# Patient Record
Sex: MALE | Race: WHITE | Employment: STUDENT | URBAN - METROPOLITAN AREA
[De-identification: names, ages, dates, MRNs, and addresses within clinical notes are randomized per-mention and may not be internally consistent; named-entity substitution may affect disease eponyms.]

---

## 2020-05-05 ENCOUNTER — APPOINTMENT (OUTPATIENT)
Dept: CT IMAGING | Facility: HOSPITAL | Age: 25
End: 2020-05-05
Attending: EMERGENCY MEDICINE
Payer: COMMERCIAL

## 2020-05-05 ENCOUNTER — HOSPITAL ENCOUNTER (EMERGENCY)
Facility: HOSPITAL | Age: 25
Discharge: HOME | End: 2020-05-05
Attending: EMERGENCY MEDICINE
Payer: COMMERCIAL

## 2020-05-05 VITALS
SYSTOLIC BLOOD PRESSURE: 121 MMHG | BODY MASS INDEX: 24.31 KG/M2 | DIASTOLIC BLOOD PRESSURE: 83 MMHG | TEMPERATURE: 98 F | WEIGHT: 210.13 LBS | HEIGHT: 78 IN | OXYGEN SATURATION: 97 % | RESPIRATION RATE: 17 BRPM | HEART RATE: 60 BPM

## 2020-05-05 DIAGNOSIS — N20.0 KIDNEY STONE: Primary | ICD-10-CM

## 2020-05-05 PROCEDURE — 81001 URINALYSIS AUTO W/SCOPE: CPT | Performed by: EMERGENCY MEDICINE

## 2020-05-05 PROCEDURE — 96374 THER/PROPH/DIAG INJ IV PUSH: CPT

## 2020-05-05 PROCEDURE — 85025 COMPLETE CBC W/AUTO DIFF WBC: CPT | Performed by: EMERGENCY MEDICINE

## 2020-05-05 PROCEDURE — 96376 TX/PRO/DX INJ SAME DRUG ADON: CPT

## 2020-05-05 PROCEDURE — 96375 TX/PRO/DX INJ NEW DRUG ADDON: CPT

## 2020-05-05 PROCEDURE — 99284 EMERGENCY DEPT VISIT MOD MDM: CPT

## 2020-05-05 PROCEDURE — 74176 CT ABD & PELVIS W/O CONTRAST: CPT | Performed by: EMERGENCY MEDICINE

## 2020-05-05 PROCEDURE — 80053 COMPREHEN METABOLIC PANEL: CPT | Performed by: EMERGENCY MEDICINE

## 2020-05-05 RX ORDER — ONDANSETRON 4 MG/1
4 TABLET, ORALLY DISINTEGRATING ORAL EVERY 4 HOURS PRN
Qty: 10 TABLET | Refills: 0 | Status: SHIPPED | OUTPATIENT
Start: 2020-05-05 | End: 2020-05-11

## 2020-05-05 RX ORDER — KETOROLAC TROMETHAMINE 30 MG/ML
15 INJECTION, SOLUTION INTRAMUSCULAR; INTRAVENOUS ONCE
Status: COMPLETED | OUTPATIENT
Start: 2020-05-05 | End: 2020-05-05

## 2020-05-05 RX ORDER — TAMSULOSIN HYDROCHLORIDE 0.4 MG/1
0.4 CAPSULE ORAL DAILY
Qty: 7 CAPSULE | Refills: 0 | Status: SHIPPED | OUTPATIENT
Start: 2020-05-05 | End: 2020-05-12

## 2020-05-05 RX ORDER — HYDROMORPHONE HYDROCHLORIDE 1 MG/ML
0.5 INJECTION, SOLUTION INTRAMUSCULAR; INTRAVENOUS; SUBCUTANEOUS EVERY 30 MIN PRN
Status: DISCONTINUED | OUTPATIENT
Start: 2020-05-05 | End: 2020-05-05

## 2020-05-05 RX ORDER — HYDROCODONE BITARTRATE AND ACETAMINOPHEN 5; 325 MG/1; MG/1
1-2 TABLET ORAL EVERY 4 HOURS PRN
Qty: 20 TABLET | Refills: 0 | Status: SHIPPED | OUTPATIENT
Start: 2020-05-05 | End: 2020-05-11

## 2020-05-05 RX ORDER — ONDANSETRON 2 MG/ML
4 INJECTION INTRAMUSCULAR; INTRAVENOUS ONCE
Status: COMPLETED | OUTPATIENT
Start: 2020-05-05 | End: 2020-05-05

## 2020-05-05 NOTE — ED INITIAL ASSESSMENT (HPI)
Arrives with c/o left sided flank pain that began approximately 30 minutes PTA. Hx of kidney stones.

## 2020-05-05 NOTE — ED PROVIDER NOTES
Patient Seen in: [facility] Emergency Department      History   Patient presents with:  Abdomen/Flank Pain    Stated Complaint: back pain, urinating blood this am     HPI    53-year-old male comes to the hospital with a complaint of having difficult masses noted  Back nontender without CVA tenderness  Extremity no clubbing, cyanosis or edema noted.   Full range of motion noted without tenderness  Neuro: No focal deficits noted    ED Course     Labs Reviewed   URINALYSIS WITH CULTURE REFLEX - Abnormal; Registry) which includes the Dose Index Registry. PATIENT STATED HISTORY: (As transcribed by Technologist)  Patient presents with left flank pain with hematuria sudden onset since 830am.  Burning sensation with urination. Hx Kidney stones.     FINDINGS:  K tromethamine (TORADOL) 30 MG/ML injection 15 mg (15 mg Intravenous Given 5/5/20 0920)   ondansetron HCl (ZOFRAN) injection 4 mg (4 mg Intravenous Given 5/5/20 0920)     The patient is pain-free at this time.   He will be discharged home and follow-up with gloria

## 2020-05-08 ENCOUNTER — OFFICE VISIT (OUTPATIENT)
Dept: SURGERY | Facility: CLINIC | Age: 25
End: 2020-05-08
Payer: COMMERCIAL

## 2020-05-08 ENCOUNTER — LAB ENCOUNTER (OUTPATIENT)
Dept: LAB | Age: 25
End: 2020-05-08
Attending: UROLOGY
Payer: COMMERCIAL

## 2020-05-08 ENCOUNTER — TELEPHONE (OUTPATIENT)
Dept: SURGERY | Facility: CLINIC | Age: 25
End: 2020-05-08

## 2020-05-08 VITALS — SYSTOLIC BLOOD PRESSURE: 118 MMHG | HEART RATE: 80 BPM | DIASTOLIC BLOOD PRESSURE: 79 MMHG

## 2020-05-08 DIAGNOSIS — N20.1 BILATERAL URETERAL CALCULI: ICD-10-CM

## 2020-05-08 DIAGNOSIS — N20.0 RENAL CALCULI: ICD-10-CM

## 2020-05-08 DIAGNOSIS — N20.1 URETERAL STONE: Primary | ICD-10-CM

## 2020-05-08 PROCEDURE — 99243 OFF/OP CNSLTJ NEW/EST LOW 30: CPT | Performed by: UROLOGY

## 2020-05-08 PROCEDURE — 80048 BASIC METABOLIC PNL TOTAL CA: CPT

## 2020-05-08 PROCEDURE — 81003 URINALYSIS AUTO W/O SCOPE: CPT | Performed by: UROLOGY

## 2020-05-08 PROCEDURE — 36415 COLL VENOUS BLD VENIPUNCTURE: CPT

## 2020-05-08 NOTE — TELEPHONE ENCOUNTER
Spoke to patient in response to his questions about some activity restrictions. Informed him that there is no specific activity restrictions for patient with kidney stones.  But RN encouraged him to increase fluid intake, avoid high in sodium, and cautious

## 2020-05-08 NOTE — PROGRESS NOTES
Rooming Clinician:     Judy Marks is a 27year old male. Patient presents with:   Follow - Up: foolow up from ER visit 5/5/20  Kidney Stones / Ureteral Stone  Pain: Yes left  Nausea: No    Vomiting: No    Previous Stones: Yes: 2016-passed    Chemical An collection, ductal dilatation, or atrophy. SPLEEN:  No enlargement or focal lesion. AORTA/VASCULAR:  No aneurysm. RETROPERITONEUM:  No mass or adenopathy. BOWEL/MESENTERY:  Normal caliber small and large bowel including the appendix.   No free f [facility]) cap Take 1 capsule (0.4 mg total) by mouth daily for 7 days. 7 capsule 0       Lorabid [Loracarbef]      Penicillins                Past Medical History:   Diagnosis Date   • Kidney stones      History reviewed. No pertinent surgical history.    So that he had been doing pretty well until coming into the office and now he is describing some pain in the left side. Because he has bilateral ureteral calculi I suggested that he consider ureteroscopic stone manipulation.   He does have final examinations Future        Follow up examination on Monday for video visit    The patient indicates understanding of these issues and agrees to the plan. Carmela Fink M.D.   Urology

## 2020-05-08 NOTE — PROGRESS NOTES
Your recent basic metabolic panel is normal.  Recommend follow up in the office as directed.     Sincerely,  MD Gladys Eisenberg

## 2020-05-11 ENCOUNTER — TELEPHONE (OUTPATIENT)
Dept: SURGERY | Facility: CLINIC | Age: 25
End: 2020-05-11

## 2020-05-11 DIAGNOSIS — N20.1 BILATERAL URETERAL CALCULI: Primary | ICD-10-CM

## 2020-05-11 PROCEDURE — 99212 OFFICE O/P EST SF 10 MIN: CPT | Performed by: UROLOGY

## 2020-05-11 RX ORDER — CIPROFLOXACIN 500 MG/1
TABLET, FILM COATED ORAL 2 TIMES DAILY
Status: ON HOLD | COMMUNITY
End: 2020-05-14

## 2020-05-11 NOTE — PROGRESS NOTES
Rooming Clinician:      Kristopher Jolly is a 27year old male. Patient presents with:   Follow - Up: foolow up from ER visit 5/5/20  Kidney Stones / Ureteral Stone  Pain: Yes left  Nausea: No    Vomiting: No    Previous Stones: Yes: 2016-passed     Chemical fluid collection, ductal dilatation, or atrophy.    SPLEEN:  No enlargement or focal lesion.    AORTA/VASCULAR:  No aneurysm.    RETROPERITONEUM:  No mass or adenopathy.    BOWEL/MESENTERY:  Normal caliber small and large bowel including the appendix.  No • tamsulosin (FLOMAX) cap Take 1 capsule (0.4 mg total) by mouth daily for 7 days. 7 capsule 0         Lorabid [Loracarbef]      Penicillins                     Past Medical History:   Diagnosis Date   • Kidney stones        History reviewed.  No pertinen calculi as well as bilateral renal calculi. Patient stated that he had been doing pretty well until coming into the office and now he is describing some pain in the left side.   Because he has bilateral ureteral calculi I suggested that he consider uretero (8); Future     Renal calculi  -     BASIC METABOLIC PANEL (8); Future           Follow up examination on Monday for video visit     The patient indicates understanding of these issues and agrees to the plan.     Carmela Fink M.D.   Urology

## 2020-05-11 NOTE — TELEPHONE ENCOUNTER
Virtual Telephone Check-In    Tia [place] verbally consents to a Virtual/Telephone Check-In visit on 05/11/20. Patient understands and accepts financial responsibility for any deductible, co-insurance and/or co-pays associated with this service.     Du

## 2020-05-12 ENCOUNTER — TELEPHONE (OUTPATIENT)
Dept: SURGERY | Facility: CLINIC | Age: 25
End: 2020-05-12

## 2020-05-12 ENCOUNTER — LAB ENCOUNTER (OUTPATIENT)
Dept: LAB | Age: 25
End: 2020-05-12
Attending: UROLOGY
Payer: COMMERCIAL

## 2020-05-12 ENCOUNTER — NURSE ONLY (OUTPATIENT)
Dept: LAB | Facility: HOSPITAL | Age: 25
End: 2020-05-12
Payer: COMMERCIAL

## 2020-05-12 DIAGNOSIS — N20.0 KIDNEY STONES: ICD-10-CM

## 2020-05-12 DIAGNOSIS — N20.0 KIDNEY STONES: Primary | ICD-10-CM

## 2020-05-12 DIAGNOSIS — N20.0 NEPHROLITHIASIS: ICD-10-CM

## 2020-05-12 LAB — SARS-COV-2 RNA RESP QL NAA+PROBE: NOT DETECTED

## 2020-05-12 PROCEDURE — 82365 CALCULUS SPECTROSCOPY: CPT

## 2020-05-12 NOTE — H&P
Patient comes to the office with complaint of sudden onset of left flank pain 3 days ago and was seen in the emergency department at which time he was found to have 3 mm bilateral distal ureteral calculi with mild obstruction on the left with evidence SYSTEMS:      GENERAL HEALTH: feels well otherwise  SKIN: denies any unusual skin lesions or rashes  RESPIRATORY: denies shortness of breath with exertion  CARDIOVASCULAR: denies chest pain on exertion  GI: denies abdominal pain and denies heartburn  : s tamsulosin pain medication as needed.  We will arrange a follow-up video visit on Monday to reevaluate the patient and plan for ureteroscopy with stone manipulation on Thursday or Friday unless patient develops more symptoms or oliguria.     Basic metaboli

## 2020-05-12 NOTE — TELEPHONE ENCOUNTER
Spoke to patient. Patient states he has passed kidney stones, has collected them and inquiring about where to bring them. Instructed patient to bring them to the lab, I will place an order. Patient verbalized understanding.

## 2020-05-12 NOTE — TELEPHONE ENCOUNTER
Pt called stating pt is scheduled surgery 5-14-20 for kidney stone. Pt passed a kidney stone 5-12-20. Should pt bring the stone to the office or to the surgery. What should pt do with the kidney stone.   Please call

## 2020-05-13 ENCOUNTER — ANESTHESIA EVENT (OUTPATIENT)
Dept: SURGERY | Facility: HOSPITAL | Age: 25
End: 2020-05-13
Payer: COMMERCIAL

## 2020-05-14 ENCOUNTER — HOSPITAL ENCOUNTER (OUTPATIENT)
Dept: GENERAL RADIOLOGY | Facility: HOSPITAL | Age: 25
Discharge: HOME | End: 2020-05-14
Attending: UROLOGY
Payer: COMMERCIAL

## 2020-05-14 ENCOUNTER — ANESTHESIA (OUTPATIENT)
Dept: SURGERY | Facility: HOSPITAL | Age: 25
End: 2020-05-14
Payer: COMMERCIAL

## 2020-05-14 ENCOUNTER — HOSPITAL ENCOUNTER (OUTPATIENT)
Facility: HOSPITAL | Age: 25
Setting detail: HOSPITAL OUTPATIENT SURGERY
Discharge: HOME | End: 2020-05-14
Attending: UROLOGY | Admitting: UROLOGY
Payer: COMMERCIAL

## 2020-05-14 ENCOUNTER — APPOINTMENT (OUTPATIENT)
Dept: GENERAL RADIOLOGY | Facility: HOSPITAL | Age: 25
End: 2020-05-14
Attending: UROLOGY
Payer: COMMERCIAL

## 2020-05-14 VITALS
HEART RATE: 77 BPM | HEIGHT: 78 IN | WEIGHT: 271.19 LBS | SYSTOLIC BLOOD PRESSURE: 109 MMHG | RESPIRATION RATE: 16 BRPM | OXYGEN SATURATION: 99 % | DIASTOLIC BLOOD PRESSURE: 65 MMHG | BODY MASS INDEX: 31.38 KG/M2 | TEMPERATURE: 98 F

## 2020-05-14 DIAGNOSIS — N20.0 NEPHROLITHIASIS: Primary | ICD-10-CM

## 2020-05-14 DIAGNOSIS — R52 PAIN: ICD-10-CM

## 2020-05-14 DIAGNOSIS — N20.1 URETERAL STONE: ICD-10-CM

## 2020-05-14 PROCEDURE — BT140ZZ FLUOROSCOPY OF KIDNEYS, URETERS AND BLADDER USING HIGH OSMOLAR CONTRAST: ICD-10-PCS | Performed by: UROLOGY

## 2020-05-14 PROCEDURE — 74420 UROGRAPHY RTRGR +-KUB: CPT | Performed by: UROLOGY

## 2020-05-14 PROCEDURE — 52005 CYSTO W/URTRL CATHJ: CPT | Performed by: UROLOGY

## 2020-05-14 RX ORDER — TAMSULOSIN HYDROCHLORIDE 0.4 MG/1
0.4 CAPSULE ORAL DAILY
Status: ON HOLD | COMMUNITY
End: 2020-05-14

## 2020-05-14 RX ORDER — ONDANSETRON 2 MG/ML
INJECTION INTRAMUSCULAR; INTRAVENOUS AS NEEDED
Status: DISCONTINUED | OUTPATIENT
Start: 2020-05-14 | End: 2020-05-14 | Stop reason: SURG

## 2020-05-14 RX ORDER — SODIUM CHLORIDE, SODIUM LACTATE, POTASSIUM CHLORIDE, CALCIUM CHLORIDE 600; 310; 30; 20 MG/100ML; MG/100ML; MG/100ML; MG/100ML
INJECTION, SOLUTION INTRAVENOUS CONTINUOUS
Status: DISCONTINUED | OUTPATIENT
Start: 2020-05-14 | End: 2020-05-14

## 2020-05-14 RX ORDER — HYDROCODONE BITARTRATE AND ACETAMINOPHEN 5; 325 MG/1; MG/1
2 TABLET ORAL EVERY 4 HOURS PRN
Status: CANCELLED | OUTPATIENT
Start: 2020-05-14

## 2020-05-14 RX ORDER — KETOROLAC TROMETHAMINE 30 MG/ML
INJECTION, SOLUTION INTRAMUSCULAR; INTRAVENOUS AS NEEDED
Status: DISCONTINUED | OUTPATIENT
Start: 2020-05-14 | End: 2020-05-14 | Stop reason: SURG

## 2020-05-14 RX ORDER — HYDROCODONE BITARTRATE AND ACETAMINOPHEN 5; 325 MG/1; MG/1
1 TABLET ORAL AS NEEDED
Status: DISCONTINUED | OUTPATIENT
Start: 2020-05-14 | End: 2020-05-14

## 2020-05-14 RX ORDER — LIDOCAINE HYDROCHLORIDE 10 MG/ML
INJECTION, SOLUTION EPIDURAL; INFILTRATION; INTRACAUDAL; PERINEURAL AS NEEDED
Status: DISCONTINUED | OUTPATIENT
Start: 2020-05-14 | End: 2020-05-14 | Stop reason: SURG

## 2020-05-14 RX ORDER — DEXAMETHASONE SODIUM PHOSPHATE 4 MG/ML
VIAL (ML) INJECTION AS NEEDED
Status: DISCONTINUED | OUTPATIENT
Start: 2020-05-14 | End: 2020-05-14 | Stop reason: SURG

## 2020-05-14 RX ORDER — HYDROCODONE BITARTRATE AND ACETAMINOPHEN 5; 325 MG/1; MG/1
2 TABLET ORAL AS NEEDED
Status: DISCONTINUED | OUTPATIENT
Start: 2020-05-14 | End: 2020-05-14

## 2020-05-14 RX ORDER — ACETAMINOPHEN 325 MG/1
650 TABLET ORAL EVERY 4 HOURS PRN
Status: CANCELLED | OUTPATIENT
Start: 2020-05-14

## 2020-05-14 RX ORDER — METOCLOPRAMIDE HYDROCHLORIDE 5 MG/ML
10 INJECTION INTRAMUSCULAR; INTRAVENOUS EVERY 6 HOURS PRN
Status: DISCONTINUED | OUTPATIENT
Start: 2020-05-14 | End: 2020-05-14

## 2020-05-14 RX ORDER — ACETAMINOPHEN 500 MG
1000 TABLET ORAL ONCE
Status: DISCONTINUED | OUTPATIENT
Start: 2020-05-14 | End: 2020-05-14 | Stop reason: HOSPADM

## 2020-05-14 RX ORDER — HYDROMORPHONE HYDROCHLORIDE 1 MG/ML
0.4 INJECTION, SOLUTION INTRAMUSCULAR; INTRAVENOUS; SUBCUTANEOUS EVERY 5 MIN PRN
Status: DISCONTINUED | OUTPATIENT
Start: 2020-05-14 | End: 2020-05-14

## 2020-05-14 RX ORDER — NALOXONE HYDROCHLORIDE 0.4 MG/ML
80 INJECTION, SOLUTION INTRAMUSCULAR; INTRAVENOUS; SUBCUTANEOUS AS NEEDED
Status: DISCONTINUED | OUTPATIENT
Start: 2020-05-14 | End: 2020-05-14

## 2020-05-14 RX ORDER — CLINDAMYCIN PHOSPHATE 900 MG/50ML
INJECTION INTRAVENOUS
Status: DISCONTINUED
Start: 2020-05-14 | End: 2020-05-14

## 2020-05-14 RX ORDER — CLINDAMYCIN PHOSPHATE 900 MG/50ML
900 INJECTION INTRAVENOUS ONCE
Status: COMPLETED | OUTPATIENT
Start: 2020-05-14 | End: 2020-05-14

## 2020-05-14 RX ORDER — DEXAMETHASONE SODIUM PHOSPHATE 4 MG/ML
4 VIAL (ML) INJECTION EVERY 6 HOURS
Status: DISCONTINUED | OUTPATIENT
Start: 2020-05-14 | End: 2020-05-14

## 2020-05-14 RX ORDER — ONDANSETRON 2 MG/ML
4 INJECTION INTRAMUSCULAR; INTRAVENOUS AS NEEDED
Status: DISCONTINUED | OUTPATIENT
Start: 2020-05-14 | End: 2020-05-14

## 2020-05-14 RX ORDER — ONDANSETRON 2 MG/ML
INJECTION INTRAMUSCULAR; INTRAVENOUS
Status: DISCONTINUED
Start: 2020-05-14 | End: 2020-05-14 | Stop reason: WASHOUT

## 2020-05-14 RX ORDER — HYDROCODONE BITARTRATE AND ACETAMINOPHEN 5; 325 MG/1; MG/1
1 TABLET ORAL EVERY 4 HOURS PRN
Status: CANCELLED | OUTPATIENT
Start: 2020-05-14

## 2020-05-14 RX ADMIN — CLINDAMYCIN PHOSPHATE 900 MG: 900 INJECTION INTRAVENOUS at 15:41:00

## 2020-05-14 RX ADMIN — ONDANSETRON 4 MG: 2 INJECTION INTRAMUSCULAR; INTRAVENOUS at 15:41:00

## 2020-05-14 RX ADMIN — KETOROLAC TROMETHAMINE 30 MG: 30 INJECTION, SOLUTION INTRAMUSCULAR; INTRAVENOUS at 15:57:00

## 2020-05-14 RX ADMIN — DEXAMETHASONE SODIUM PHOSPHATE 4 MG: 4 MG/ML VIAL (ML) INJECTION at 15:41:00

## 2020-05-14 RX ADMIN — LIDOCAINE HYDROCHLORIDE 100 MG: 10 INJECTION, SOLUTION EPIDURAL; INFILTRATION; INTRACAUDAL; PERINEURAL at 15:34:00

## 2020-05-14 NOTE — ANESTHESIA POSTPROCEDURE EVALUATION
Gustavo Lynch Patient Status:  Hospital Outpatient Surgery   Age/Gender 27year old male MRN [id number]   Location [address] [facility] Attending Lesa Dee MD   Hosp Day # 0 PCP MD Siddhartha Reyes

## 2020-05-14 NOTE — ANESTHESIA PROCEDURE NOTES
Airway  Urgency: elective      General Information and Staff    Patient location during procedure: OR  Anesthesiologist: Marta Worrell MD  Performed: anesthesiologist     Indications and Patient Condition  Indications for airway management: anesthesia  S

## 2020-05-14 NOTE — OPERATIVE REPORT
Operative Note    Patient Name: Naomi Raymundo    Date of Procedure: 5/14/2020    Preoperative Diagnosis: Bilateral distal ureteral calculi    Postoperative Diagnosis: Spontaneously passed bilateral distal ureteral calculi    Primary Surgeon: Shona Lanes.  Waldo to be performed and concurred.   Then the 25 [place]riot cystoscope was introduced into the bladder under direct vision at which time examination of the distal urethra was found to be normal.  Prostatic urethra was also normal without any evidence of any obstruc

## 2020-05-14 NOTE — ANESTHESIA PREPROCEDURE EVALUATION
PRE-OP EVALUATION    Patient Name: Emily García    Pre-op Diagnosis: Ureteral stone [N20.1]    Procedure(s):  Cystoscopy, bilateral ureteroscopy, laser lithotripsy, possible ureteral stent placement    Surgeon(s) and Role:     * Refugio Machado MD - Pr 05/08/2020     05/08/2020    CO2 29.0 05/08/2020    BUN 15 05/08/2020    CREATSERUM 1.05 05/08/2020     (H) 05/08/2020    CA 8.8 05/08/2020            Airway      Mallampati: II  Mouth opening: >3 FB    Neck ROM: full Cardiovascular    Cardiov

## 2020-05-14 NOTE — INTERVAL H&P NOTE
Pre-op Diagnosis: Ureteral stone [N20.1]    The above referenced H&P was reviewed by Alessandra Treviño MD on 5/14/2020, the patient was examined and no significant changes have occurred in the patient's condition since the H&P was performed.   I discussed w

## 2020-05-18 NOTE — PROGRESS NOTES
Your recent stone analysis shows that the stone was made of calcium oxalate, mostly the monohydrate version which is very hard.   Because of your history of recurrent stones as well as a history of hypercalciuria I recommend follow up in the office as direc

## 2020-06-04 ENCOUNTER — OFFICE VISIT (OUTPATIENT)
Dept: SURGERY | Facility: CLINIC | Age: 25
End: 2020-06-04
Payer: COMMERCIAL

## 2020-06-04 VITALS — DIASTOLIC BLOOD PRESSURE: 83 MMHG | HEART RATE: 92 BPM | SYSTOLIC BLOOD PRESSURE: 126 MMHG

## 2020-06-04 DIAGNOSIS — N20.0 KIDNEY STONES: ICD-10-CM

## 2020-06-04 DIAGNOSIS — R82.90 URINE FINDING: Primary | ICD-10-CM

## 2020-06-04 PROCEDURE — 81003 URINALYSIS AUTO W/O SCOPE: CPT | Performed by: UROLOGY

## 2020-06-04 PROCEDURE — 99024 POSTOP FOLLOW-UP VISIT: CPT | Performed by: UROLOGY

## 2020-06-04 NOTE — PROGRESS NOTES
Rooming Clinician:     Es Orantes is a 27year old male. Patient presents with:  Surgical Followup: s/p URS 5/14/20. States he feels fine. No pain, denies hematuria. HPI:     Patient returns to the office for follow-up.   He has a history of c 05/08/2020    CA 8.8 05/08/2020    ALB 4.3 05/05/2020    ALKPHO 86 05/05/2020    AST 12 (L) 05/05/2020    ALT 35 05/05/2020    INR 1.0 12/08/2009       X-RAY[de-identified]     CT scan was reviewed with the patient showing small bilateral nonobstructing renal calculi

## 2020-07-02 ENCOUNTER — APPOINTMENT (OUTPATIENT)
Dept: LAB | Age: 25
End: 2020-07-02
Attending: UROLOGY
Payer: COMMERCIAL

## 2020-07-02 DIAGNOSIS — N20.0 KIDNEY STONES: ICD-10-CM

## 2020-07-02 PROCEDURE — 83986 ASSAY PH BODY FLUID NOS: CPT

## 2020-07-02 PROCEDURE — 84300 ASSAY OF URINE SODIUM: CPT

## 2020-07-02 PROCEDURE — 83735 ASSAY OF MAGNESIUM: CPT

## 2020-07-02 PROCEDURE — 84105 ASSAY OF URINE PHOSPHORUS: CPT

## 2020-07-02 PROCEDURE — 82340 ASSAY OF CALCIUM IN URINE: CPT

## 2020-07-02 PROCEDURE — 82507 ASSAY OF CITRATE: CPT

## 2020-07-02 PROCEDURE — 83945 ASSAY OF OXALATE: CPT

## 2020-07-02 PROCEDURE — 84133 ASSAY OF URINE POTASSIUM: CPT

## 2020-07-02 PROCEDURE — 84560 ASSAY OF URINE/URIC ACID: CPT

## 2020-07-02 PROCEDURE — 82436 ASSAY OF URINE CHLORIDE: CPT

## 2020-07-08 LAB
CALCIUM URINE - PER 24H: 188 MG/D
CALCIUM URINE - PER VOL: 8.1 MG/DL
CHLORIDE URINE - PER 24H: 170 MMOL/D
CHLORIDE URINE - PER VOL.: 73 MMOL/L
CITRIC ACID, URINE - MG/DAY: 330 MG/D
CITRIC ACID, URINE - MG/L: 142 MG/L
CREATININE, URINE - PER 24H: 2325 MG/D
CREATININE, URINE - PER VOLUME: 100 MG/DL
HOURS COLLECTED: 24 HR
MAGNESIUM URINE - PER 24H: 156 MG/D
MAGNESIUM URINE - PER VOL: 6.7 MG/DL
OXALATE, URINE - MG/DAY: 70 MG/D
OXALATE, URINE - MG/L: 30 MG/L
PH, URINE: 6.29
PHOSPHORUS URINE - PER 24H: 1465 MG/D
PHOSPHORUS URINE - PER VOL: 63 MG/DL
POTASSIUM URINE - PER 24H: 81 MMOL/D
POTASSIUM URINE - PER VOL.: 35 MMOL/L
SODIUM URINE - PER VOL.: 88 MMOL/L
SODIUM URINE -PER 24H: 205 MMOL/D
TOTAL VOLUME: 2325 ML
URIC ACID URINE - PER 24H: 949 MG/D
URIC ACID URINE - PER VOL: 40.8 MG/DL

## 2020-07-17 ENCOUNTER — TELEPHONE (OUTPATIENT)
Dept: SURGERY | Facility: CLINIC | Age: 25
End: 2020-07-17

## 2020-07-17 NOTE — TELEPHONE ENCOUNTER
RN spoke to patient and relayed MD's message and recommendations, \"Your recent 24 hour urine collection is abnormal.  Volume, urinary calcium and citrate are normal. Oxalate, phosphorus in your gas that are high.  Actually can be elevated with excess consu

## 2022-05-22 ENCOUNTER — HOSPITAL ENCOUNTER (OUTPATIENT)
Age: 27
Discharge: HOME | End: 2022-05-22
Attending: EMERGENCY MEDICINE
Payer: COMMERCIAL

## 2022-05-22 VITALS
WEIGHT: 280 LBS | OXYGEN SATURATION: 98 % | TEMPERATURE: 98 F | SYSTOLIC BLOOD PRESSURE: 127 MMHG | HEIGHT: 78 IN | BODY MASS INDEX: 32.4 KG/M2 | RESPIRATION RATE: 18 BRPM | HEART RATE: 81 BPM | DIASTOLIC BLOOD PRESSURE: 76 MMHG

## 2022-05-22 DIAGNOSIS — A09 INFECTIOUS COLITIS, ENTERITIS AND GASTROENTERITIS: Primary | ICD-10-CM

## 2022-05-22 PROCEDURE — 99203 OFFICE O/P NEW LOW 30 MIN: CPT | Performed by: EMERGENCY MEDICINE

## 2022-05-22 RX ORDER — ONDANSETRON 8 MG/1
8 TABLET, ORALLY DISINTEGRATING ORAL EVERY 4 HOURS PRN
Qty: 10 TABLET | Refills: 0 | Status: SHIPPED | OUTPATIENT
Start: 2022-05-22 | End: 2022-05-29

## 2022-05-22 NOTE — ED INITIAL ASSESSMENT (HPI)
Just returned from [facility].  Diarrhea x 48 hours. Dizzy x 36 hours. + nausea no vomiting, episodes of diaphoresis. Admits to drinking a lot of Alcohol while in [facility].  Denies CP or SOB.

## (undated) DEVICE — STERILE POLYISOPRENE POWDER-FREE SURGICAL GLOVES: Brand: PROTEXIS

## (undated) DEVICE — SOL  .9 3000ML

## (undated) DEVICE — CYSTO CDS-LF: Brand: MEDLINE INDUSTRIES, INC.

## (undated) DEVICE — KENDALL SCD EXPRESS SLEEVES, KNEE LENGTH, MEDIUM: Brand: KENDALL SCD

## (undated) DEVICE — ZIPWIRE GUIDEWIRE .035X150 STR

## (undated) DEVICE — Device

## (undated) DEVICE — TIGERTAIL 5F FLXTIP 70CM

## (undated) NOTE — LETTER
Date & Time: 5/22/2022, 11:54 AM  Patient: Justin Alfonso  Encounter Provider(s):    Warren Meckel, MD       To Whom It May Concern:    Eleno Galeazzi was seen and treated in our department on 5/22/2022. He should not return to work until 5/24/22.     If you have any questions or concerns, please do not hesitate to call.        _____________________________  Physician/APC Signature